# Patient Record
Sex: FEMALE | NOT HISPANIC OR LATINO | Employment: PART TIME | URBAN - METROPOLITAN AREA
[De-identification: names, ages, dates, MRNs, and addresses within clinical notes are randomized per-mention and may not be internally consistent; named-entity substitution may affect disease eponyms.]

---

## 2023-06-01 LAB — THYROTROPIN (MIU/L) IN SER/PLAS BY DETECTION LIMIT <= 0.05 MIU/L: 1.03 MIU/L (ref 0.44–3.98)

## 2023-06-29 LAB
ALBUMIN (G/DL) IN SER/PLAS: 4.6 G/DL (ref 3.4–5)
ANION GAP IN SER/PLAS: 15 MMOL/L (ref 10–20)
CALCIUM (MG/DL) IN SER/PLAS: 9.7 MG/DL (ref 8.6–10.6)
CARBON DIOXIDE, TOTAL (MMOL/L) IN SER/PLAS: 31 MMOL/L (ref 21–32)
CHLORIDE (MMOL/L) IN SER/PLAS: 97 MMOL/L (ref 98–107)
CREATININE (MG/DL) IN SER/PLAS: 0.58 MG/DL (ref 0.5–1.05)
GFR FEMALE: >90 ML/MIN/1.73M2
GLUCOSE (MG/DL) IN SER/PLAS: 91 MG/DL (ref 74–99)
PHOSPHATE (MG/DL) IN SER/PLAS: 3.6 MG/DL (ref 2.5–4.9)
POTASSIUM (MMOL/L) IN SER/PLAS: 3.4 MMOL/L (ref 3.5–5.3)
SODIUM (MMOL/L) IN SER/PLAS: 140 MMOL/L (ref 136–145)
UREA NITROGEN (MG/DL) IN SER/PLAS: 15 MG/DL (ref 6–23)

## 2023-08-18 ENCOUNTER — HOSPITAL ENCOUNTER (OUTPATIENT)
Dept: DATA CONVERSION | Facility: HOSPITAL | Age: 63
Discharge: HOME | End: 2023-08-18
Payer: COMMERCIAL

## 2023-08-18 ENCOUNTER — HOSPITAL ENCOUNTER (OUTPATIENT)
Dept: DATA CONVERSION | Facility: HOSPITAL | Age: 63
End: 2023-08-18

## 2023-08-18 DIAGNOSIS — E05.00 THYROTOXICOSIS WITH DIFFUSE GOITER WITHOUT THYROTOXIC CRISIS OR STORM: ICD-10-CM

## 2023-08-18 DIAGNOSIS — R93.1 ABNORMAL FINDINGS ON DIAGNOSTIC IMAGING OF HEART AND CORONARY CIRCULATION: ICD-10-CM

## 2023-09-02 PROBLEM — E07.9 SWELLING OF THYROID GLAND: Status: ACTIVE | Noted: 2023-09-02

## 2023-09-02 PROBLEM — R00.2 PALPITATIONS: Status: ACTIVE | Noted: 2023-09-02

## 2023-09-02 RX ORDER — ATORVASTATIN CALCIUM 40 MG/1
1 TABLET, FILM COATED ORAL DAILY
COMMUNITY
Start: 2023-05-30

## 2023-09-02 RX ORDER — PERPHENAZINE 4 MG
TABLET ORAL
COMMUNITY

## 2023-09-02 RX ORDER — CHLORTHALIDONE 25 MG/1
25 TABLET ORAL
COMMUNITY
Start: 2023-05-30

## 2023-09-02 RX ORDER — AMLODIPINE BESYLATE 5 MG/1
5 TABLET ORAL
COMMUNITY
Start: 2023-05-30

## 2023-09-02 RX ORDER — MELOXICAM 15 MG/1
15 TABLET ORAL
COMMUNITY
Start: 2023-05-30

## 2023-09-02 RX ORDER — VIT C/E/ZN/COPPR/LUTEIN/ZEAXAN 250MG-90MG
1000 CAPSULE ORAL
COMMUNITY
Start: 2023-06-05 | End: 2023-09-03

## 2023-10-10 ENCOUNTER — LAB (OUTPATIENT)
Dept: LAB | Facility: LAB | Age: 63
End: 2023-10-10
Payer: COMMERCIAL

## 2023-10-10 ENCOUNTER — OFFICE VISIT (OUTPATIENT)
Dept: ENDOCRINOLOGY | Facility: CLINIC | Age: 63
End: 2023-10-10
Payer: COMMERCIAL

## 2023-10-10 VITALS
SYSTOLIC BLOOD PRESSURE: 121 MMHG | BODY MASS INDEX: 32.89 KG/M2 | HEART RATE: 89 BPM | DIASTOLIC BLOOD PRESSURE: 86 MMHG | WEIGHT: 217 LBS | HEIGHT: 68 IN

## 2023-10-10 DIAGNOSIS — E05.00 GRAVES DISEASE: Primary | ICD-10-CM

## 2023-10-10 DIAGNOSIS — E05.00 GRAVES DISEASE: ICD-10-CM

## 2023-10-10 LAB
ALBUMIN SERPL BCP-MCNC: 5.1 G/DL (ref 3.4–5)
ALP SERPL-CCNC: 142 U/L (ref 33–136)
ALT SERPL W P-5'-P-CCNC: 54 U/L (ref 7–45)
ANION GAP SERPL CALC-SCNC: 17 MMOL/L (ref 10–20)
AST SERPL W P-5'-P-CCNC: 52 U/L (ref 9–39)
BILIRUB SERPL-MCNC: 0.5 MG/DL (ref 0–1.2)
BUN SERPL-MCNC: 15 MG/DL (ref 6–23)
CALCIUM SERPL-MCNC: 10.4 MG/DL (ref 8.6–10.6)
CHLORIDE SERPL-SCNC: 99 MMOL/L (ref 98–107)
CO2 SERPL-SCNC: 31 MMOL/L (ref 21–32)
CREAT SERPL-MCNC: 0.75 MG/DL (ref 0.5–1.05)
GFR SERPL CREATININE-BSD FRML MDRD: 90 ML/MIN/1.73M*2
GLUCOSE SERPL-MCNC: 90 MG/DL (ref 74–99)
POTASSIUM SERPL-SCNC: 4.1 MMOL/L (ref 3.5–5.3)
PROT SERPL-MCNC: 7.9 G/DL (ref 6.4–8.2)
PTH-INTACT SERPL-MCNC: 154.3 PG/ML (ref 18.5–88)
SODIUM SERPL-SCNC: 143 MMOL/L (ref 136–145)

## 2023-10-10 PROCEDURE — 80053 COMPREHEN METABOLIC PANEL: CPT

## 2023-10-10 PROCEDURE — 83970 ASSAY OF PARATHORMONE: CPT

## 2023-10-10 PROCEDURE — 99204 OFFICE O/P NEW MOD 45 MIN: CPT | Performed by: INTERNAL MEDICINE

## 2023-10-10 PROCEDURE — 84445 ASSAY OF TSI GLOBULIN: CPT

## 2023-10-10 PROCEDURE — 36415 COLL VENOUS BLD VENIPUNCTURE: CPT

## 2023-10-10 RX ORDER — CHOLECALCIFEROL (VITAMIN D3) 25 MCG
25 TABLET ORAL DAILY
COMMUNITY

## 2023-10-10 NOTE — PROGRESS NOTES
New patient.    Reason for consult: Grave's disease / Thyroid swelling?    HPI:  63 years old female with H/O Obesity, HTN and Persistent A.fib (on eliquis).     Background history:  - As per pt. she was diagnosed with Grave's disease about 4 years ago, was taking MMI , initially 5 mg daily , later on 2.5 mg and then it was stopped (about a year ago).  We do not have access to her previous labs / TSI.     - She was seen by family physician in July 23 with concern of Rt. Sided swelling of her face for almost 4 years, underwent CT scan neck that revealed mildly prominent thyroid without a discrete lesion.     - In August 23 - [facility] Thyroid revealed “Thyromegaly with heterogeneous parenchyma and with increased thyroid vascularity without mass or nodularity.”    - Most recent TSH 1.03 (6/2023).     Interval history:  Today, came to establish care with us.     Heat / cold intolerance: none  Palpitations: none  Changes in weight: none  Appetite: none  Diarrhea / constipation: none  Tremors in hands: none  Excessive sweating: none  Sleep: Good. Feels refreshed in the morning.   Eye symptoms: yes, occasional discomfort upon exposure to direct sun light.   Menses: post menopausal   H/O radiation exposure: denies  FH of thyroid disorders: denies.     Social history:  Lives alone  Works as nursing home  Quit smoking about 13 years ago (used to smoke a pack over 2 weeks)  Denies use of illicit drugs.    Family history:   Not significant for thyroid disorders.     ROS:  10 points ROS done , negative except as above.     Physical examination:      General: Patient is alert / oriented *3, in no acute distress. Affected with obesity.  HENT: Head normocephalic, atraumatic  Chvostek sign +ve , B/L salivary glands feel enlarged upon palpation.   Neck: supple, ROM normal , about 25 g , non tender goiter palpable.   Eyes: PERRL , EOMI intact , Conjunctival erythema noted , eye globes are firm upon palpation.  Respiratory: RR normal ,  equal air entry B/L, no added sounds  CVS: HR normal, S1+S2+0  Abdomen: no abdominal distension.   Musculoskeletal: normal gait, no peripheral edema  CNS: Cranial nerves grossly intact. No weakness, DTRs are not exaggerated / no delayed relaxation phase.  Psych: appropriate mood     Labs:        [facility] Thyroid: 8/18/23  IMPRESSION:  Thyromegaly with heterogeneous echogenicity of the thyroid parenchyma and  with increased thyroid vascularity without mass or nodularity.      Current Outpatient Medications   Medication Sig Dispense Refill    amLODIPine (Norvasc) 5 mg tablet Take 1 tablet (5 mg) by mouth once daily.      apixaban (Eliquis) 5 mg tablet Take 1 tablet (5 mg) by mouth twice a day.      atorvastatin (Lipitor) 40 mg tablet Take 1 tablet (40 mg) by mouth once daily.      chlorthalidone (Hygroton) 25 mg tablet Take 1 tablet (25 mg) by mouth once daily.      cholecalciferol (Vitamin D3) 25 MCG (1000 UT) tablet Take 1 tablet (25 mcg) by mouth once daily.      DESONIDE TOP Desonide      meloxicam (Mobic) 15 mg tablet Take 1 tablet (15 mg) by mouth once daily.      POTASSIUM CHLORIDE ORAL Potassium Chloride      collagen-biotin-ascorbic acid (Collagen 1500 Plus C) 500 mg-800 mcg- 50 mg capsule Collagen      docosahexaenoic acid/epa (FISH OIL ORAL) Fish Oil      METHIMAZOLE ORAL methIMAzole      multivit-min/iron/FA/vit K/lut (CENTRUM SILVER WOMEN ORAL) 1 tablet.       No current facility-administered medications for this visit.      Assessment and plan:    63 years old female with H/O Grave's disease , diagnosed few years ago (about 4 years), was on MMI (initially 5 mg daily followed by 2.5 mg daily) , now off MMI for about a year.  Today , came to establish care with us.  Looks euthyroid clinically   Occasional eye discomfort upon exposure to sunlight / conjunctival erythema / firm globes upon palpation  - concerning for Graves eye disease.  Most recent TSH 1.03 ( 6/23)    Recommendations:  - will get labs today  -  "Will follow the lab results and inform her about our further plan.  - We discussed in detail about eye protection , \" use glasses / goggles when outside. Use natural tear eye drops  during day , may use gel at night time. Prop up head slightly at night time (may use 2 pillows instead of one).\"  - Pt. is suggested to take vitamin D supplement daily.   - RTC in 6 months.     Patient seen / examined and discussed with attending - Dr. Royal   "

## 2023-10-11 NOTE — PROGRESS NOTES
I saw and evaluated the patient. I personally obtained the key and critical portions of the history and physical exam or was physically present for key and critical portions performed by the resident/fellow. I reviewed the resident/fellow's documentation and discussed the patient with the resident/fellow. I agree with the resident/fellow's medical decision making as documented in the note.    Alec Royal MD

## 2023-10-20 LAB — TSI SER-ACNC: <1 TSI INDEX

## 2023-10-25 DIAGNOSIS — E07.9 SWELLING OF THYROID GLAND: Primary | ICD-10-CM

## 2023-10-25 NOTE — PROGRESS NOTES
Pt. Was seen in endocrine clinic on 10/10/23 , labs were ordered , unfortunately, half of the labs were resulted but not TFTs .  Writer spoke to the lab multiple times, as per them TFTs are not processed.  So, today, TFTs are being ordered again, pt. Is informed about new order and need for blood draw.  She will get lab work up done in next week.  Will follow once done.

## 2023-10-31 ENCOUNTER — LAB (OUTPATIENT)
Dept: LAB | Facility: LAB | Age: 63
End: 2023-10-31
Payer: COMMERCIAL

## 2023-10-31 DIAGNOSIS — E07.9 SWELLING OF THYROID GLAND: ICD-10-CM

## 2023-10-31 LAB
T3 SERPL-MCNC: 99 NG/DL (ref 60–200)
T4 FREE SERPL-MCNC: 0.99 NG/DL (ref 0.78–1.48)
TSH SERPL-ACNC: 1.55 MIU/L (ref 0.44–3.98)

## 2023-10-31 PROCEDURE — 84443 ASSAY THYROID STIM HORMONE: CPT

## 2023-10-31 PROCEDURE — 84480 ASSAY TRIIODOTHYRONINE (T3): CPT

## 2023-10-31 PROCEDURE — 84439 ASSAY OF FREE THYROXINE: CPT

## 2023-10-31 PROCEDURE — 36415 COLL VENOUS BLD VENIPUNCTURE: CPT

## 2023-11-06 ENCOUNTER — DOCUMENTATION (OUTPATIENT)
Dept: ENDOCRINOLOGY | Facility: HOSPITAL | Age: 63
End: 2023-11-06
Payer: COMMERCIAL

## 2023-11-06 DIAGNOSIS — E07.9 SWELLING OF THYROID GLAND: Primary | ICD-10-CM

## 2023-11-06 NOTE — PROGRESS NOTES
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  Corrected Ca level is 9.5 with elevated PTH of 154.3 ( on P/E: Chvostek sign was +ve) - secondary hyperparathyroidism?     Plan:  - continue eye protective measures that were discussed in clinic visit  - Will suggest to take 5000 units vitamin D daily  - Will repeat the labs before her next visit with us  - RTC in 6 months.

## 2024-04-16 ENCOUNTER — APPOINTMENT (OUTPATIENT)
Dept: ENDOCRINOLOGY | Facility: CLINIC | Age: 64
End: 2024-04-16
Payer: COMMERCIAL

## 2025-01-27 ENCOUNTER — APPOINTMENT (OUTPATIENT)
Dept: PODIATRY | Facility: CLINIC | Age: 65
End: 2025-01-27
Payer: MEDICAID